# Patient Record
Sex: FEMALE | Race: BLACK OR AFRICAN AMERICAN | NOT HISPANIC OR LATINO | Employment: PART TIME | URBAN - METROPOLITAN AREA
[De-identification: names, ages, dates, MRNs, and addresses within clinical notes are randomized per-mention and may not be internally consistent; named-entity substitution may affect disease eponyms.]

---

## 2022-07-18 ENCOUNTER — HOSPITAL ENCOUNTER (EMERGENCY)
Facility: HOSPITAL | Age: 37
Discharge: HOME | End: 2022-07-18
Attending: EMERGENCY MEDICINE
Payer: MEDICAID

## 2022-07-18 VITALS
TEMPERATURE: 98 F | RESPIRATION RATE: 17 BRPM | DIASTOLIC BLOOD PRESSURE: 72 MMHG | HEIGHT: 65 IN | BODY MASS INDEX: 36.65 KG/M2 | OXYGEN SATURATION: 99 % | SYSTOLIC BLOOD PRESSURE: 139 MMHG | WEIGHT: 220 LBS | HEART RATE: 72 BPM

## 2022-07-18 DIAGNOSIS — R51.9 FACIAL PAIN: Primary | ICD-10-CM

## 2022-07-18 LAB
B-HCG UR QL: NEGATIVE
CTP QC/QA: YES

## 2022-07-18 PROCEDURE — 81025 URINE PREGNANCY TEST: CPT | Performed by: PHYSICIAN ASSISTANT

## 2022-07-18 PROCEDURE — 96372 THER/PROPH/DIAG INJ SC/IM: CPT | Performed by: PHYSICIAN ASSISTANT

## 2022-07-18 PROCEDURE — 63600175 PHARM REV CODE 636 W HCPCS: Performed by: PHYSICIAN ASSISTANT

## 2022-07-18 PROCEDURE — 99284 EMERGENCY DEPT VISIT MOD MDM: CPT

## 2022-07-18 RX ORDER — NAPROXEN 500 MG/1
500 TABLET ORAL 2 TIMES DAILY PRN
Qty: 30 TABLET | Refills: 0 | Status: SHIPPED | OUTPATIENT
Start: 2022-07-18

## 2022-07-18 RX ORDER — KETOROLAC TROMETHAMINE 30 MG/ML
30 INJECTION, SOLUTION INTRAMUSCULAR; INTRAVENOUS
Status: COMPLETED | OUTPATIENT
Start: 2022-07-18 | End: 2022-07-18

## 2022-07-18 RX ADMIN — KETOROLAC TROMETHAMINE 30 MG: 30 INJECTION, SOLUTION INTRAMUSCULAR; INTRAVENOUS at 05:07

## 2022-07-18 NOTE — ED TRIAGE NOTES
38 yo female presents to ED with c/o numbness to rt side of face that started in her mouth today. Pt reports that she ate chips and dip tonight and felt some tingling around her gum line at the top of her mouth. Pt states that she then began to feel numbness to her mouth and took an excedrin aound 1830. Pt reports that symptoms were alleviated but returned at 2030, she took another excedrin with no alleviation and rt side of face began to feel numb.

## 2022-07-18 NOTE — ED PROVIDER NOTES
Encounter Date: 7/18/2022       History     Chief Complaint   Patient presents with    Facial Pain     Presents with a right sided face pain that began at her lip described as a throbbing sensation radiating to behind right eye accompanied by facial numbness that began 10.5 hours ago at 6 PM, denies any other symptoms     Chief Complaint: Facial pain  History of  Present Illness: History obtained from patient. This 37 y.o. female who has no known past medical history presents to the ED complaining of right-sided maxillary facial pain that began at 6:00 p.m. after drinking very cold water.  Patient states that pain has been waxing and waning in slightly improved after taking Excedrin.  Pain is 7/10.  Patient also reports intermittent tingling sensation to the right side of her face.  She reports right-sided headache.  Denies dizziness, weakness, numbness, nausea, vomiting, vision changes, difficulty with speech, difficulty with gait, fever, difficulty swallowing        Review of patient's allergies indicates:  No Known Allergies  No past medical history on file.  No past surgical history on file.  No family history on file.     Review of Systems   Constitutional: Negative for chills and fever.   HENT: Negative for congestion, rhinorrhea and sore throat.         (+) right-sided facial pain   Eyes: Negative for visual disturbance.   Respiratory: Negative for cough and shortness of breath.    Cardiovascular: Negative for chest pain.   Gastrointestinal: Negative for abdominal pain, diarrhea, nausea and vomiting.   Genitourinary: Negative for dysuria, frequency and hematuria.   Musculoskeletal: Negative for back pain.   Skin: Negative for rash.   Neurological: Negative for dizziness, weakness and headaches.       Physical Exam     Initial Vitals [07/18/22 0433]   BP Pulse Resp Temp SpO2   139/72 72 17 98 °F (36.7 °C) 99 %      MAP       --         Physical Exam    Constitutional: She appears well-developed and  well-nourished. She is active.  Non-toxic appearance. She does not have a sickly appearance. She does not appear ill.   HENT:   Head: Normocephalic and atraumatic.   Nose: Nose normal.   Mouth/Throat: Uvula is midline, oropharynx is clear and moist and mucous membranes are normal. No trismus in the jaw.   Patient reports reproducible pain with palpation to the right maxillary gingiva.  No drainable abscess.  Dentition is intact.   Eyes: Conjunctivae, EOM and lids are normal. Pupils are equal, round, and reactive to light.   Neck: Neck supple.   Normal range of motion.   Full passive range of motion without pain.     Cardiovascular: Normal rate and regular rhythm.   Musculoskeletal:      Cervical back: Full passive range of motion without pain, normal range of motion and neck supple.     Neurological: She is alert and oriented to person, place, and time. She has normal strength. No cranial nerve deficit or sensory deficit. She displays a negative Romberg sign. Coordination and gait normal. GCS eye subscore is 4. GCS verbal subscore is 5. GCS motor subscore is 6.   Patient able to differentiate between soft and sharp sensations to the face. Normal finger-nose.  Normal rapid alternating movements.  Normal heel-to-shin.  No pronator drift.     Skin: Skin is warm. Capillary refill takes less than 2 seconds.         ED Course   Procedures  Labs Reviewed   POCT URINE PREGNANCY          Imaging Results    None          Medications   ketorolac injection 30 mg (30 mg Intramuscular Given 7/18/22 0510)     Medical Decision Making:   ED Management:  This is an evaluation of a 37 y.o. female who presents to the ED for right-sided facial pain.  Vital signs are stable.   Afebrile.  Patient is nontoxic appearing and in no acute distress.  No focal neurological deficits on exam.  Patient reports reproducible pain with palpation to the right upper gingiva.  Dentition is intact.    Given benign exam, I doubt acute process such as CVA  at this time.  Etiology likely secondary to neuropathic pain from stimulation of the superior alveolar nerve.    Patient given return precautions and instructed to return to the emergency department for any new or worsening symptoms. Patient verbalized understanding and agreed with plan. Encouraged follow-up with PCP.                        Clinical Impression:   Final diagnoses:  [R51.9] Facial pain (Primary)          ED Disposition Condition    Discharge Stable        ED Prescriptions     Medication Sig Dispense Start Date End Date Auth. Provider    naproxen (NAPROSYN) 500 MG tablet Take 1 tablet (500 mg total) by mouth 2 (two) times daily as needed (Pain). Take With Meals 30 tablet 7/18/2022  Kaden Darling PA-C        Follow-up Information     Follow up With Specialties Details Why Contact Info    Your Dentist  Schedule an appointment as soon as possible for a visit in 1 day For evaluation of dental pain     Your PCP  Schedule an appointment as soon as possible for a visit in 1 day For reevaluation     [facility] Emergency Dept Emergency Medicine Go in 1 day If symptoms worsen [address]  [facility] [id number] [phone]           Kaden Darling PA-C  07/18/22 0524